# Patient Record
(demographics unavailable — no encounter records)

---

## 2025-03-19 NOTE — HISTORY OF PRESENT ILLNESS
[FreeTextEntry1] : Dr. Bernard 70 year year-old woman with history of cad s.p stents, htn is here in the sleep center to address insomnia.  Patient has chronic insomnia for many years.   She tried melatonin and tylenol pm which did not help.  Over time her insomnia has gotten worse.  She is feeling tired and exhausted during the day.  Patient has some symptoms suggestive of sleep apnea.  Doesnt know if there is snoring as she sleeps alone, patient has witnessed apneas and choking sensations.  She is not sleepy while driving.  Stop bang score is 3 which is intermediate risk for sleep apnea.  Patient exercises earlier in the day 3 days a week.  She does not drink excessive caffeinated products, she drinks about 2-3 cups of tea.  She eats dinner 5-6 at night and she is not on electronics closer to the bedtime.  She watches some TV in the living room before sleeping, turns off the tv to sleep.  There is no alcohol use to explain the insomnia.  She goes to bed 10 pm able to fall asleep easily, but cant stay asleep. Wakes up at 1 am and it takes a while for her to fall back to sleep. She is getting average about 4-6 hrs of broken sleep. Her sleep is light and also documented by fitbit.

## 2025-03-19 NOTE — ASSESSMENT
[FreeTextEntry1] : Assessment Chronic Insomnia: Long-standing difficulty maintaining sleep (waking at 1:00 AM with prolonged return to sleep), resulting in 4-6 hours of fragmented, light sleep and daytime fatigue. Worsening over time, refractory to melatonin and Tylenol PM. Suspected Obstructive Sleep Apnea (MUNDO): STOP-BANG score of 3, witnessed apneas, and choking sensations suggest intermediate risk, potentially contributing to sleep fragmentation and daytime tiredness.  Plan Polysomnogram (PSG): Ordered to evaluate sleep architecture, rule out MUNDO (given STOP-BANG 3 and apneas/choking), and assess for other sleep disorders (e.g., periodic limb movements). Results will guide further management. Pharmacologic Management: Initiate doxylamine (Unisom) 25 mg as needed, up to 3 nights/week, for sleep maintenance. Cautioned against nightly use due to long-term risks (e.g., tolerance, anticholinergic effects, especially with CAD/age). Sleep Hygiene Reinforcement: Continue current practices: consistent 10:00 PM bedtime, no electronics near bed, early exercise, and dinner at 5-6 PM. Limit tea to mornings/early afternoon to reduce caffeine impact on sleep maintenance. If awake >20 minutes after 1:00 AM, get out of bed and engage in a relaxing activity (e.g., reading) until sleepy, then return to bed. Sleep Diary: Track sleep onset, awakenings, total sleep time, and daytime fatigue for 2 weeks to correlate with PSG findings. Follow-Up: Return in 4 weeks or after PSG results to review findings, adjust insomnia therapy, and address potential MUNDO (e.g., CPAP if indicated). Sooner if symptoms worsen or new concerns arise.